# Patient Record
Sex: FEMALE | Race: WHITE | NOT HISPANIC OR LATINO | URBAN - METROPOLITAN AREA
[De-identification: names, ages, dates, MRNs, and addresses within clinical notes are randomized per-mention and may not be internally consistent; named-entity substitution may affect disease eponyms.]

---

## 2017-01-15 ENCOUNTER — EMERGENCY (EMERGENCY)
Facility: HOSPITAL | Age: 47
LOS: 1 days | Discharge: HOME | End: 2017-01-15
Attending: EMERGENCY MEDICINE | Admitting: EMERGENCY MEDICINE
Payer: COMMERCIAL

## 2017-01-15 VITALS
SYSTOLIC BLOOD PRESSURE: 127 MMHG | HEART RATE: 83 BPM | DIASTOLIC BLOOD PRESSURE: 74 MMHG | TEMPERATURE: 98 F | OXYGEN SATURATION: 100 % | RESPIRATION RATE: 16 BRPM

## 2017-01-15 PROCEDURE — 99283 EMERGENCY DEPT VISIT LOW MDM: CPT

## 2017-01-15 RX ORDER — KETOROLAC TROMETHAMINE 30 MG/ML
60 SYRINGE (ML) INJECTION ONCE
Qty: 0 | Refills: 0 | Status: DISCONTINUED | OUTPATIENT
Start: 2017-01-15 | End: 2017-01-15

## 2017-01-15 RX ADMIN — Medication 60 MILLIGRAM(S): at 16:27

## 2017-01-15 NOTE — ED PROVIDER NOTE - MEDICAL DECISION MAKING DETAILS
Acute exacerbation of LBP.  H&P c/w muscular etiology.  No evidence spinal etiology or cord compression.  pain med.  No xray indicated. Re-eval.

## 2017-01-15 NOTE — ED PROVIDER NOTE - OBJECTIVE STATEMENT
46y F presents to the ED with intermittent lower back pain x years. Pt has history of back pain, last time back pain was this severe was 3 years ago. Pt states she was told she has arthritis of spine after having XR that showed inflammation of the spine. Took muscle relaxers and Naproxen with no relief.  Back pain is worse with movement and coughing. Denies fever, chills, numbness/tingling, or any other complaints. No daily medications. Smoker. NKDA. 46y F presents to the ED with intermittent lower back pain x years. Pt has history of back pain, last time back pain was this severe was 3 years ago. Pt states she was told she has arthritis of spine after having XR that showed inflammation of the spine. Took muscle relaxers and Naproxen with no relief.  Back pain is worse with movement and coughing.  No radiation down lower extremities.  No known exacerbating injury.  No saddle area numbness.  No incontinence or retention. Denies fever, chills, numbness/tingling, or any other complaints. No daily medications. Smoker. ALISON.  Has seen ortho MD for back pain in the past and can follow up with him.

## 2017-01-15 NOTE — ED PROVIDER NOTE - NS ED MD SCRIBE ATTENDING SCRIBE SECTIONS
HISTORY OF PRESENT ILLNESS/HIV/REVIEW OF SYSTEMS/VITAL SIGNS( Pullset)/PAST MEDICAL/SURGICAL/SOCIAL HISTORY/DISPOSITION

## 2017-01-15 NOTE — ED PROVIDER NOTE - PROGRESS NOTE DETAILS
KHOA Zarate:(QUID PA) pt feels better pain improved ambulating without difficulty.  Results reviewed with patient.  Discharge reviewed and discussed with patient.

## 2017-01-15 NOTE — ED PROVIDER NOTE - CONSTITUTIONAL, MLM
normal... Well appearing, well nourished, awake, alert, oriented to person, place, time/situation and in no apparent distress.  Noting slow ambulation due to LBP.

## 2017-01-15 NOTE — ED PROVIDER NOTE - PLAN OF CARE
Follow up with your Doctor in 1-2 days.  Heat to back.  Take Motrin 600mg orally every 8 hours as needed for pain take with food.  Return to the ER for any persistent/worsening or new symptoms, weakness, numbness, difficulty urinating or any concerning symptoms.

## 2017-01-15 NOTE — ED ADULT TRIAGE NOTE - CHIEF COMPLAINT QUOTE
pt amb to triage c/o lower back pain, states hx lower back pain 2/2 arthritis in spine, states pain since this morning, no relief w/ OTC advil, denies numbness or tingling in extremities, + ROM in lower extremities

## 2017-01-15 NOTE — ED PROVIDER NOTE - CARE PLAN
Principal Discharge DX:	Back pain  Instructions for follow-up, activity and diet:	Follow up with your Doctor in 1-2 days.  Heat to back.  Take Motrin 600mg orally every 8 hours as needed for pain take with food.  Return to the ER for any persistent/worsening or new symptoms, weakness, numbness, difficulty urinating or any concerning symptoms.
